# Patient Record
(demographics unavailable — no encounter records)

---

## 2025-02-24 NOTE — HISTORY OF PRESENT ILLNESS
[de-identified] : REENA SHEEHAN 62 year old male presents for evaluation of left greater than right knee pain. The left knee has been bothersome for several years worse in the prior 4 years. He describes a football injury in 1980s treated with surgical repair in 1996 with an ACL reconstruction. The pain is medial and sharp associated with swelling, bucking, clicking, and LOM. His right knee became bothersome in June 2024 following a volleyball game with twisting localized in the distal quadricep tendon described as dull with occasional sharpness. He saw surgeon which recommended no surgery and descried Sx to partial tear of ACL and meniscus. He is able to ambulate for several miles unassisted, manages stairs 1 step at a time, and is treated with Diclofenac and Advil. He takes part in home exercise program and denies undergoing injury. Marlen H:D. Sees PCP and cardiologist. CRYSTAL.

## 2025-02-24 NOTE — ADDENDUM
[FreeTextEntry1] : This note was written by Emmanuel Samuel on 02/24/2025 acting as scribe for Dr. Jaden Santos M.D.   I, Dr. Jaden Santos, have read and attest that all the information, medical decision making and discharge instructions within are true and accurate.

## 2025-02-24 NOTE — DISCUSSION/SUMMARY
[de-identified] : Discussed at length the natural history of B/L knee degenerative arthritis more symptomatic on the left as he had prior surgery as noted above with retained hardware from prior ACL reconstruction and reviewed non-operative and operative treatment. Prior non-operative treatment for more than 3 months by either myself or prior treating physicians, including NSAIDs, injection therapy, a structured exercise program or physiotherapy and weight management has not resolved the condition. Due to the acute pain related to bone-on-bone arthritis demonstrated on radiograph as noted above and associated functional disability that impacts all appropriate activities of daily living, I recommend a LEFT total knee replacement. A thorough discussion regarding the risks, benefits, convalescence and alternatives were reviewed. The risks can include but are not limited to anesthesia risk, bleeding wit possible transfusion, nerve injury, infection, revision surgery due to implant failure, bone fracture, deep vein thrombosis, cardiac failure, pneumonia, and respiratory distress. The patient's expectations were reviewed and compared to the surgeon's expectations. This allowed for a discussion regarding reasonable expectations for functional improvement, pain relief, and return to normal activities of daily living. Numerous questions were asked and answered to their satisfaction. The patient was involved in the decision-making process - we discussed implant choice and fixation along with all details of TKA. Models were used as an educational tool. Surgery will be scheduled at a convenient time. We may need to remove hardware if it interferes with components.   We also discussed the patient's current ongoing medical condition that were noted above, which will have an impact on their perioperative care. These conditions will need to be optimized. Therefore, they will need preoperative medical clearance.  Discussed at length the nature of the patients condition. Their right knee symptoms appear secondary to degenerative arthritis. There is no need for surgical intervention at this time. Therefore, we will continue nonoperatively.

## 2025-02-24 NOTE — PHYSICAL EXAM
[de-identified] : General appearance: well-nourished and hydrated, pleasant, alert and oriented x 3, cooperative. HEENT: Normocephalic, EOM intact, Nasal septum midline, Oral cavity clear, External auditory canal clear. Cardiovascular: no apparent abnormalities, no lower leg edema, no varicosities, pedal pulses are palpable. Lymphatics Lymph nodes: none palpated, Lymphedema: not present. Neurologic: sensation is normal, no muscle weakness in upper or lower extremities, patella tendon reflexes intact. Dermatologic no apparent skin lesions, moist, warm, no rash. Spine: cervical spine appears normal and moves freely, thoracic spine appears normal and moves freely, lumbosacral spine appears normal and moves freely. Gait: nonantalgic.   Left knee Inspection: no effusion or erythema. Wounds: healed midline incision, healed curvy line medial incision, healed lateral incision Alignment: 5 degrees varus alignment Palpation: medial tenderness on palpation. ROM active (in degrees): 5 degree flexion contracture, 5-100 with crepitus and discomfort Ligamentous laxity: all ligaments appear stable,, negative ant. drawer test, negative post. drawer test, stable to varus stress test, stable to valgus stress test. negative Lachman's test, negative pivot shift test Meniscal Test: negative McMurrays, negative Nakia. Patellofemoral Alignment Test: Q angle-, normal. Muscle Test: good quad strength. Leg examination: calf is soft and non-tender. surgical incision over Achilles   Right knee Inspection: no effusion or erythema. Wounds: none. Alignment: normal. Palpation: mild tenderness over superior pole of patella on palpation. ROM active (in degrees): 0-135 with mild crepitus Ligamentous laxity: all ligaments appear stable, negative ant. drawer test, negative post. drawer test, stable to varus stress test, stable to valgus stress test. negative Lachman's test, negative pivot shift test Meniscal Test: negative McMurrays, negative Nakia. Patellofemoral Alignment Test: Q angle-, normal. Muscle Test: good quad strength. Leg examination: calf soft and non tender.   Left hip Inspection: No swelling or ecchymosis. Wounds: none. Palpation: non-tender. Stability: no instability. Strength: 5/5 all motor groups. ROM: no pain with FROM. Leg length: equal.   Right hip Inspection: No swelling or ecchymosis. Wounds: none. Palpation: non-tender. Stability: no instability. Strength: 5/5 all motor groups. ROM: no pain with FROM. Leg length: equal.   Left ankle Inspection: no erythema noted, no swelling noted. Palpation: no pain on palpation. ROM: FROM without crepitus. Muscle strength: 5/5. Stability: no instability noted.   Right ankle Inspection: no erythema noted, no swelling noted. ROM: FROM without crepitus. Palpation: no pain on palpation. Muscle strength: 5/5. Stability: no instability noted.   Left foot Inspection: color, texture and turgor are normal. mild lateral pes planus ROM: full range of motion of all joints without pain or crepitus. Palpation: no tenderness. Stability: no instability noted.   Right foot Inspection: color, texture and turgor are normal. mild lateral pes planus ROM: full range of motion of all joints without pain or crepitus. Palpation: no tenderness. Stability: no instability noted.   Left shoulder Inspection: no muscle asymmetry, no atrophy. Palpation: no tenderness noted, ACJ non-tender. ROM: limited total elevation with impingement and pain. Strength testing): anterior deltoid, supraspinatus, infraspinatus, subscapularis all 5/5. Stability test: ant. apprehension negative, post. apprehension negative, relocation test negative. Impingement Test: negative NEER.   Right shoulder Inspection: no muscle asymmetry, no atrophy. Palpation: no tenderness noted, ACJ non-tender. ROM: limited total elevation with impingement and pain. Strength testing): anterior deltoid, supraspinatus, infraspinatus, subscapularis all 5/5. Stability test: ant. apprehension negative, post. apprehension negative, relocation test negative. Impingement Test: negative NEER. Surgical Wounds: none.   Left elbow Inspection: negative swelling. Wounds: none. Palpation: non-tender. ROM: full ROM. Strength: 5/5 all groups. Stability: no instability. Mass: none.   Right elbow Inspection: negative swelling. Wounds: none. Palpation: non-tender. ROM: full ROM. Strength: 5/5 all groups. Stability: no instability. Mass: none.   Left wrist Inspection: negative swelling. Wound: none. Palpation (bone): no tenderness. ROM: full ROM. Strength: full , good.   Right wrist Inspection: negative swelling. Wound: none. Palpation (bone): no tenderness. ROM: full ROM. Strength: full , good.   Left hand Inspection: no skin changes, normal appearance. Wounds: none. Strength: full , able to make full fist. Sensation: light touch intact all fingers and thumb. Vascular: good capillary refill < 3 seconds, all fingers and thumb. Mass: none.   Right hand Inspection: no skin changes, normal appearance. Wounds: none. Strength: full , able to make full fist. Sensation: light touch intact all fingers and thumb. Vascular: good capillary refill < 3 seconds, all fingers and thumb. Mass: none. [de-identified] : Left knee x-rays, standing AP/Lateral and Merchant films, and 45-degree PA standing view, taken at the office today shows diffuse tricompartmental degenerative arthritis, slight varus deformity, patella at appropriate height and center position, medial bone on bone sclerosis, evidence of ACL reconstruction, femoral and tibial screws noted, Traction spur superior pole of patella, patellofemoral joint space narrowing, significant osteophytes, Kellgren and Nithin grade 4   Right knee x-rays, standing AP/Lateral and Merchant films, and 45-degree PA standing view, taken at the office today shows normal alignment, decreased medial joint height, patella at appropriate height and center position, Traction spur superior pole of patella, patellofemoral joint space narrowing, Kellgren and Nithin grade 2-3 with significant patellofemoral arthritis   MRI right knee taken 12/16/24: films brought in and reviewed on PACs, see attached report. I agree with radiologist report including degenerative single, medial meniscus with no affirmative tear, Degenerative arthritis in the medial compartment with subchondral cyst and partial sprain of quadricep and ACL tear.

## 2025-03-25 NOTE — DISCUSSION/SUMMARY
[de-identified] : The natural progression of Osteoarthritis was explained to the patient.  We discussed the possible treatment options from conservative to operative.  These included NSAIDS, Glucosamine and Chondrotin sulfate, and Physical Therapy as well different types of injections.  We also discussed that at some point they may progress to needed a TKA.  Information and pamphlets were given when appropriate.  We had an extensive discussion regarding surgical intervention including risk, benefits and alternatives.  The risks include, but are not limited to infection, bleeding, injury to small nerves and blood vessels, pain, stiffness, phlebitis, DVT malunion, nonunion, and need for secondary procedures.  Preoperative, intraoperative and postoperative care were discussed and outlined to the patient as well.  The patient has had an opportunity to ask any question and all were answered to the best of my ability.  The natural progression of Osteoarthritis was explained to the patient. We discussed the possible treatment options from conservative to operative. These included NSAIDS, Glucosamine and Chondroitin sulfate, and Physical Therapy as well different types of injections. We also discussed that at some point they may progress to needing a TKA.  Information and pamphlets were given when appropriate.   Patient Complains of pain in Knee with a level that often reaches greater than an 8/10. The Pain has been progressively worsening of his/her treatment course. The pain has interfered with their ADLs and worsens with weight bearing. On exam they often have episodes of swelling/effusion with limited ROM. Pain worsens with ROM passive and active and I can palpate crepitus.   X-rays were reviewed with the patient, and they show joint space narrowing, subchondral sclerosis, osteophyte formation, and subchondral cysts.   After a period of more than 12 weeks physical therapy or exercise program done with me or another treating physician, they have continued pain. The patient has failed a trial of NSAID medication or pain relievers if they were unable to tolerate NSAID medications as well as a series of injection, steroid or Hyaluronic Acid. After a long discussion with the patient both the patient and I have decided we have exhausted all forms of less radical treatments, and they would like to proceed with Total Knee Replacement   We discussed my findings and the natural history of their condition. We talked about the details of the proposed surgery and the recovery. We discussed the material risks, possible benefits and alternatives to surgery. The risks include but are not limited to infection, bleeding and possible need for blood transfusion, fracture, bowel blockage, bladder retention or infection, need for reoperation, stiffness and/or limited range of motion, possible damage to nerves and blood vessels, failure of fixation of components, risk of deep vein thromboses and pulmonary embolism, wound healing problems, dislocation, and possible leg length discrepancy. Although incredibly rare, we also discussed the risks of a cardiac event, stroke and even death during, or following, the surgery. We discussed the type of implants the patient will be receiving and the type of fixation that will be used, as well as whether a robot or computer navigation aide will be used. The patient understands they will need medical clearance and will attend a preoperative joint education class. We also discussed the type of anesthesia they will receive, and the risks associated with hospital or rehab length of stay, obesity, diabetes and smoking.

## 2025-03-25 NOTE — ASSESSMENT
[FreeTextEntry1] : 8/22/22: Advanced OA and two previous ACL reconstructions with screws intact. At this point, patient has no pain daily, so I do not recommend surgical intervention. Will continue HEP and follow up when patient has daily pain. Recommend HA injections as they may possible help with mobility but he is not ready for this at this point  8/30/22: R hip troch bursitis/abductor tendinitis; min/mild R hip OA. Discussed conservative treatment of anti-inflammatories, ice, and PT/HEP. Also discussed a possible need for CSI if symptoms fail to improve or worsen with conservative management. May consider MRI in the future. Patient would like to proceed with HEP and ice at this time  3/25/25: Pt with adv LT knee OA with hx of two ACL reconstructions. Todays XR showing some mild progression and his pain sig worsening in the past 3 months. At this point pain is interfering with his daily life and has difficulty being as active as he would like to be. Discussed treatment options- risks and benefits explained. He understands he will need and benefit from TKA at some point in his lifetime- he will think about his options for now. Had brief discussion regarding rb/a associated with robotics vs conventional TKA. Surgical details discussed. All pt questions answered. F/up as sx dictate

## 2025-03-25 NOTE — HISTORY OF PRESENT ILLNESS
[5] : 5 [4] : 4 [Dull/Aching] : dull/aching [de-identified] : 3/25/25: 62M here for LT knee pain x many years. Worsening since knee gave way and fell in December while at the gym- went to [facility] for XR- states he had difficulty walking for about a week. Saw Dr. Santos last month and was told adv OA and would benefit from TKA. Most pain with extension and stretching- mostly anterior pain. No injections in the knee thus far. Diclofenac as needed with some relief. Here for second opinion. Ambulates without assistance  Prev doc: 8/22/22: 60 y/o M with longstanding hx of left knee with two ACL reconstructions that did well originally but has noticed over the last 2 years loss of ROM and only has occasional some pains. He is able to still function well and do most things he wants to do.  8/30/22: 58yo M presents with R lateral hip pain for the past 1.5 months with no injury. Most bothersome when laying on his right side and crossing his legs. Has tried resting from squatting to some benefit. No formal tx to date. Denies groin pain. No sig changes in his L knee  [FreeTextEntry5] : Pt LT knee buckled and fell on LT knee at the gym around [place]. Pt had x rays done [facility].

## 2025-03-25 NOTE — IMAGING
[de-identified] : left knee:\par  medial, central and lateral incision well healed\par  5/7-120\par  mild varus deformity \par  ligaments stable \par  NVI \par  5/5 \par  \par  RIGHT HIP\par  (-) Groin tenderness\par  + Greater troch bursa tenderness\par  + Abductor tenderness \par  Good ROM\par  Pain in bursa with external rotation\par  NVI\par  Non-antalgic gait w/o assistance